# Patient Record
Sex: FEMALE | Race: WHITE | NOT HISPANIC OR LATINO
[De-identification: names, ages, dates, MRNs, and addresses within clinical notes are randomized per-mention and may not be internally consistent; named-entity substitution may affect disease eponyms.]

---

## 2022-12-12 PROBLEM — Z00.00 ENCOUNTER FOR PREVENTIVE HEALTH EXAMINATION: Status: ACTIVE | Noted: 2022-12-12

## 2022-12-13 ENCOUNTER — APPOINTMENT (OUTPATIENT)
Dept: OBGYN | Facility: CLINIC | Age: 22
End: 2022-12-13

## 2022-12-13 ENCOUNTER — NON-APPOINTMENT (OUTPATIENT)
Age: 22
End: 2022-12-13

## 2022-12-13 VITALS
BODY MASS INDEX: 31.23 KG/M2 | HEIGHT: 67 IN | SYSTOLIC BLOOD PRESSURE: 118 MMHG | DIASTOLIC BLOOD PRESSURE: 68 MMHG | WEIGHT: 199 LBS

## 2022-12-13 DIAGNOSIS — Z11.3 ENCOUNTER FOR SCREENING FOR INFECTIONS WITH A PREDOMINANTLY SEXUAL MODE OF TRANSMISSION: ICD-10-CM

## 2022-12-13 DIAGNOSIS — Z12.4 ENCOUNTER FOR SCREENING FOR MALIGNANT NEOPLASM OF CERVIX: ICD-10-CM

## 2022-12-13 DIAGNOSIS — Z11.51 ENCOUNTER FOR SCREENING FOR HUMAN PAPILLOMAVIRUS (HPV): ICD-10-CM

## 2022-12-13 DIAGNOSIS — Z01.419 ENCOUNTER FOR GYNECOLOGICAL EXAMINATION (GENERAL) (ROUTINE) W/OUT ABNORMAL FINDINGS: ICD-10-CM

## 2022-12-13 PROCEDURE — 99395 PREV VISIT EST AGE 18-39: CPT

## 2022-12-13 NOTE — PLAN
[FreeTextEntry1] : Pap smear completed discussed birth control options continue multivitamin return in 12 months

## 2022-12-13 NOTE — HISTORY OF PRESENT ILLNESS
[HIV test declined] : HIV test declined [Syphilis test declined] : Syphilis test declined [Gonorrhea test declined] : Gonorrhea test declined [Chlamydia test declined] : Chlamydia test declined [Trichomonas test declined] : Trichomonas test declined [HPV test declined] : HPV test declined [Hepatitis B test declined] : Hepatitis B test declined [Hepatitis C test declined] : Hepatitis C test declined [Y] : Patient is sexually active [N] : Patient denies prior pregnancies [Normal Amount/Duration] :  normal amount and duration [Regular Cycle Intervals] : periods have been regular [Men] : men [Vaginal] : vaginal [No] : No [TextBox_4] : PT HERE FOR ANNUAL EXAM.  [PapSmeardate] : 05/26/21 [TextBox_31] : NORMAL [LMPDate] : 11/16/22 [MensesFreq] : 28 [MensesLength] : 5 [TextBox_6] : 11/16/22 [TextBox_9] : 12 [TextBox_13] : 28 [TextBox_15] : 5 [FreeTextEntry1] : 11/1/6/22

## 2022-12-14 LAB
C TRACH RRNA SPEC QL NAA+PROBE: NOT DETECTED
HPV HIGH+LOW RISK DNA PNL CVX: NOT DETECTED
N GONORRHOEA RRNA SPEC QL NAA+PROBE: NOT DETECTED
SOURCE TP AMPLIFICATION: NORMAL

## 2022-12-28 LAB — CYTOLOGY CVX/VAG DOC THIN PREP: NORMAL

## 2023-12-31 PROBLEM — Z11.3 ENCOUNTER FOR SCREENING EXAMINATION FOR SEXUALLY TRANSMITTED DISEASE: Status: RESOLVED | Noted: 2022-12-13 | Resolved: 2022-12-27

## 2024-02-26 ENCOUNTER — OFFICE (OUTPATIENT)
Dept: URBAN - METROPOLITAN AREA CLINIC 12 | Facility: CLINIC | Age: 24
Setting detail: OPHTHALMOLOGY
End: 2024-02-26
Payer: COMMERCIAL

## 2024-02-26 DIAGNOSIS — H31.002: ICD-10-CM

## 2024-02-26 DIAGNOSIS — H47.393: ICD-10-CM

## 2024-02-26 PROCEDURE — N/C NO CHARGE: Performed by: STUDENT IN AN ORGANIZED HEALTH CARE EDUCATION/TRAINING PROGRAM

## 2024-02-26 ASSESSMENT — CONFRONTATIONAL VISUAL FIELD TEST (CVF)
OD_FINDINGS: FULL
OS_FINDINGS: FULL

## 2024-02-27 ASSESSMENT — REFRACTION_AUTOREFRACTION
OS_SPHERE: -0.25
OD_AXIS: 143
OS_CYLINDER: -0.75
OS_AXIS: 009
OD_SPHERE: -0.25
OD_CYLINDER: -0.50

## 2024-02-27 ASSESSMENT — SPHEQUIV_DERIVED
OS_SPHEQUIV: -0.625
OD_SPHEQUIV: -0.5

## 2024-02-28 ENCOUNTER — OFFICE (OUTPATIENT)
Dept: URBAN - METROPOLITAN AREA CLINIC 88 | Facility: CLINIC | Age: 24
Setting detail: OPHTHALMOLOGY
End: 2024-02-28
Payer: COMMERCIAL

## 2024-02-28 DIAGNOSIS — H31.002: ICD-10-CM

## 2024-02-28 PROBLEM — H47.393 OTHER DISORDERS OF OPTIC DISC; BOTH EYES: Status: ACTIVE | Noted: 2024-02-26

## 2024-02-28 PROCEDURE — 92134 CPTRZ OPH DX IMG PST SGM RTA: CPT | Performed by: OPHTHALMOLOGY

## 2024-02-28 PROCEDURE — 92012 INTRM OPH EXAM EST PATIENT: CPT | Performed by: OPHTHALMOLOGY

## 2024-02-28 ASSESSMENT — REFRACTION_AUTOREFRACTION
OS_AXIS: 009
OD_CYLINDER: -0.50
OS_CYLINDER: -0.75
OD_AXIS: 143
OS_SPHERE: -0.25
OD_SPHERE: -0.25

## 2024-02-28 ASSESSMENT — CONFRONTATIONAL VISUAL FIELD TEST (CVF)
OS_FINDINGS: FULL
OD_FINDINGS: FULL

## 2024-02-28 ASSESSMENT — SPHEQUIV_DERIVED
OD_SPHEQUIV: -0.5
OS_SPHEQUIV: -0.625

## 2024-04-17 ENCOUNTER — OFFICE (OUTPATIENT)
Dept: URBAN - METROPOLITAN AREA CLINIC 115 | Facility: CLINIC | Age: 24
Setting detail: OPHTHALMOLOGY
End: 2024-04-17
Payer: COMMERCIAL

## 2024-04-17 DIAGNOSIS — H53.433: ICD-10-CM

## 2024-04-17 DIAGNOSIS — G93.2: ICD-10-CM

## 2024-04-17 DIAGNOSIS — H47.11: ICD-10-CM

## 2024-04-17 PROCEDURE — 92014 COMPRE OPH EXAM EST PT 1/>: CPT | Performed by: OPHTHALMOLOGY

## 2024-04-17 PROCEDURE — 92133 CPTRZD OPH DX IMG PST SGM ON: CPT | Performed by: OPHTHALMOLOGY

## 2024-04-17 PROCEDURE — 92083 EXTENDED VISUAL FIELD XM: CPT | Performed by: OPHTHALMOLOGY

## 2024-05-08 ENCOUNTER — APPOINTMENT (OUTPATIENT)
Dept: NEUROLOGY | Facility: CLINIC | Age: 24
End: 2024-05-08

## 2024-05-08 ENCOUNTER — OFFICE (OUTPATIENT)
Dept: URBAN - METROPOLITAN AREA CLINIC 115 | Facility: CLINIC | Age: 24
Setting detail: OPHTHALMOLOGY
End: 2024-05-08
Payer: COMMERCIAL

## 2024-05-08 DIAGNOSIS — G93.2: ICD-10-CM

## 2024-05-08 DIAGNOSIS — H53.433: ICD-10-CM

## 2024-05-08 DIAGNOSIS — H47.11: ICD-10-CM

## 2024-05-08 PROCEDURE — 92133 CPTRZD OPH DX IMG PST SGM ON: CPT | Performed by: OPHTHALMOLOGY

## 2024-05-08 PROCEDURE — 92012 INTRM OPH EXAM EST PATIENT: CPT | Performed by: OPHTHALMOLOGY

## 2024-05-08 PROCEDURE — 92083 EXTENDED VISUAL FIELD XM: CPT | Performed by: OPHTHALMOLOGY

## 2024-05-08 ASSESSMENT — CONFRONTATIONAL VISUAL FIELD TEST (CVF)
OS_FINDINGS: FULL
OD_FINDINGS: FULL

## 2024-05-29 ENCOUNTER — OFFICE (OUTPATIENT)
Dept: URBAN - METROPOLITAN AREA CLINIC 88 | Facility: CLINIC | Age: 24
Setting detail: OPHTHALMOLOGY
End: 2024-05-29
Payer: COMMERCIAL

## 2024-05-29 DIAGNOSIS — H31.002: ICD-10-CM

## 2024-05-29 PROCEDURE — 92012 INTRM OPH EXAM EST PATIENT: CPT | Performed by: OPHTHALMOLOGY

## 2024-05-29 PROCEDURE — 92134 CPTRZ OPH DX IMG PST SGM RTA: CPT | Performed by: OPHTHALMOLOGY

## 2024-05-29 ASSESSMENT — CONFRONTATIONAL VISUAL FIELD TEST (CVF)
OD_FINDINGS: FULL
OS_FINDINGS: FULL

## 2024-08-07 ENCOUNTER — OFFICE (OUTPATIENT)
Dept: URBAN - METROPOLITAN AREA CLINIC 115 | Facility: CLINIC | Age: 24
Setting detail: OPHTHALMOLOGY
End: 2024-08-07
Payer: COMMERCIAL

## 2024-08-07 DIAGNOSIS — H47.393: ICD-10-CM

## 2024-08-07 DIAGNOSIS — H47.11: ICD-10-CM

## 2024-08-07 DIAGNOSIS — G93.2: ICD-10-CM

## 2024-08-07 DIAGNOSIS — H53.433: ICD-10-CM

## 2024-08-07 PROCEDURE — 92083 EXTENDED VISUAL FIELD XM: CPT | Performed by: OPHTHALMOLOGY

## 2024-08-07 PROCEDURE — 92012 INTRM OPH EXAM EST PATIENT: CPT | Performed by: OPHTHALMOLOGY

## 2024-08-07 PROCEDURE — 92133 CPTRZD OPH DX IMG PST SGM ON: CPT | Performed by: OPHTHALMOLOGY

## 2024-08-07 ASSESSMENT — CONFRONTATIONAL VISUAL FIELD TEST (CVF)
OD_FINDINGS: FULL
OS_FINDINGS: FULL

## 2024-10-30 ENCOUNTER — APPOINTMENT (OUTPATIENT)
Dept: NEUROLOGY | Facility: CLINIC | Age: 24
End: 2024-10-30
Payer: COMMERCIAL

## 2024-10-30 VITALS
HEART RATE: 62 BPM | OXYGEN SATURATION: 100 % | WEIGHT: 214 LBS | BODY MASS INDEX: 33.59 KG/M2 | DIASTOLIC BLOOD PRESSURE: 76 MMHG | SYSTOLIC BLOOD PRESSURE: 111 MMHG | HEIGHT: 67 IN

## 2024-10-30 DIAGNOSIS — G93.2 BENIGN INTRACRANIAL HYPERTENSION: ICD-10-CM

## 2024-10-30 DIAGNOSIS — G43.709 CHRONIC MIGRAINE W/OUT AURA, NOT INTRACTABLE, W/OUT STATUS MIGRAINOSUS: ICD-10-CM

## 2024-10-30 PROCEDURE — 99204 OFFICE O/P NEW MOD 45 MIN: CPT

## 2024-10-30 RX ORDER — ACETAZOLAMIDE 250 MG
TABLET ORAL
Refills: 0 | Status: ACTIVE | COMMUNITY

## 2024-10-30 RX ORDER — METOPROLOL SUCCINATE 25 MG/1
25 TABLET, EXTENDED RELEASE ORAL
Refills: 0 | Status: ACTIVE | COMMUNITY

## 2024-10-30 RX ORDER — TOPIRAMATE 25 MG/1
25 TABLET, FILM COATED ORAL TWICE DAILY
Qty: 60 | Refills: 3 | Status: ACTIVE | COMMUNITY
Start: 2024-10-30 | End: 1900-01-01

## 2024-10-30 RX ORDER — RIZATRIPTAN BENZOATE 10 MG/1
10 TABLET ORAL
Qty: 9 | Refills: 3 | Status: ACTIVE | COMMUNITY
Start: 2024-10-30 | End: 1900-01-01

## 2024-10-30 RX ORDER — ATOGEPANT 60 MG/1
60 TABLET ORAL
Refills: 0 | Status: ACTIVE | COMMUNITY

## 2024-11-06 ENCOUNTER — NON-APPOINTMENT (OUTPATIENT)
Age: 24
End: 2024-11-06

## 2024-11-06 ENCOUNTER — OFFICE (OUTPATIENT)
Dept: URBAN - METROPOLITAN AREA CLINIC 115 | Facility: CLINIC | Age: 24
Setting detail: OPHTHALMOLOGY
End: 2024-11-06
Payer: COMMERCIAL

## 2024-11-06 DIAGNOSIS — H53.433: ICD-10-CM

## 2024-11-06 DIAGNOSIS — H47.11: ICD-10-CM

## 2024-11-06 DIAGNOSIS — H47.393: ICD-10-CM

## 2024-11-06 DIAGNOSIS — G93.2: ICD-10-CM

## 2024-11-06 PROCEDURE — 92133 CPTRZD OPH DX IMG PST SGM ON: CPT | Performed by: OPHTHALMOLOGY

## 2024-11-06 PROCEDURE — 92083 EXTENDED VISUAL FIELD XM: CPT | Performed by: OPHTHALMOLOGY

## 2024-11-06 PROCEDURE — 92012 INTRM OPH EXAM EST PATIENT: CPT | Performed by: OPHTHALMOLOGY

## 2024-11-06 ASSESSMENT — KERATOMETRY
OD_AXISANGLE_DEGREES: 097
OS_AXISANGLE_DEGREES: 085
OS_K2POWER_DIOPTERS: 45.50
OD_K2POWER_DIOPTERS: 45.50
OD_K1POWER_DIOPTERS: 45.25
OS_K1POWER_DIOPTERS: 45.00

## 2024-11-06 ASSESSMENT — REFRACTION_AUTOREFRACTION
OS_SPHERE: -0.25
OD_CYLINDER: -0.50
OD_AXIS: 135
OD_SPHERE: 0.00
OS_AXIS: 180
OS_CYLINDER: -0.50

## 2024-11-06 ASSESSMENT — CONFRONTATIONAL VISUAL FIELD TEST (CVF)
OS_FINDINGS: FULL
OD_FINDINGS: FULL

## 2024-11-06 ASSESSMENT — VISUAL ACUITY
OD_BCVA: 20/20-1
OS_BCVA: 20/20

## 2024-11-06 ASSESSMENT — TONOMETRY: OS_IOP_MMHG: 16

## 2025-01-30 ENCOUNTER — APPOINTMENT (OUTPATIENT)
Dept: NEUROLOGY | Facility: CLINIC | Age: 25
End: 2025-01-30
Payer: COMMERCIAL

## 2025-01-30 VITALS
HEART RATE: 66 BPM | HEIGHT: 67 IN | OXYGEN SATURATION: 99 % | SYSTOLIC BLOOD PRESSURE: 122 MMHG | WEIGHT: 206 LBS | BODY MASS INDEX: 32.33 KG/M2 | DIASTOLIC BLOOD PRESSURE: 83 MMHG

## 2025-01-30 DIAGNOSIS — G93.2 BENIGN INTRACRANIAL HYPERTENSION: ICD-10-CM

## 2025-01-30 DIAGNOSIS — G43.709 CHRONIC MIGRAINE W/OUT AURA, NOT INTRACTABLE, W/OUT STATUS MIGRAINOSUS: ICD-10-CM

## 2025-01-30 PROCEDURE — 99214 OFFICE O/P EST MOD 30 MIN: CPT

## 2025-01-30 RX ORDER — ACETAZOLAMIDE 500 MG/1
500 CAPSULE ORAL TWICE DAILY
Qty: 120 | Refills: 3 | Status: ACTIVE | COMMUNITY
Start: 2025-01-30 | End: 1900-01-01

## 2025-01-30 RX ORDER — TOPIRAMATE 50 MG/1
50 TABLET, FILM COATED ORAL TWICE DAILY
Qty: 180 | Refills: 1 | Status: ACTIVE | COMMUNITY
Start: 2025-01-30 | End: 1900-01-01

## 2025-02-27 ENCOUNTER — RX RENEWAL (OUTPATIENT)
Age: 25
End: 2025-02-27

## 2025-03-12 ENCOUNTER — OFFICE (OUTPATIENT)
Dept: URBAN - METROPOLITAN AREA CLINIC 115 | Facility: CLINIC | Age: 25
Setting detail: OPHTHALMOLOGY
End: 2025-03-12
Payer: COMMERCIAL

## 2025-03-12 DIAGNOSIS — H53.433: ICD-10-CM

## 2025-03-12 DIAGNOSIS — H47.11: ICD-10-CM

## 2025-03-12 DIAGNOSIS — H47.393: ICD-10-CM

## 2025-03-12 DIAGNOSIS — G93.2: ICD-10-CM

## 2025-03-12 PROCEDURE — 92012 INTRM OPH EXAM EST PATIENT: CPT | Performed by: OPHTHALMOLOGY

## 2025-03-12 PROCEDURE — 92083 EXTENDED VISUAL FIELD XM: CPT | Performed by: OPHTHALMOLOGY

## 2025-03-12 PROCEDURE — 92133 CPTRZD OPH DX IMG PST SGM ON: CPT | Performed by: OPHTHALMOLOGY

## 2025-03-12 ASSESSMENT — KERATOMETRY
OD_K1POWER_DIOPTERS: 45.25
OS_AXISANGLE_DEGREES: 085
OS_K2POWER_DIOPTERS: 45.50
OD_K2POWER_DIOPTERS: 45.50
OD_AXISANGLE_DEGREES: 097
OS_K1POWER_DIOPTERS: 45.00

## 2025-03-12 ASSESSMENT — REFRACTION_AUTOREFRACTION
OS_AXIS: 171
OD_CYLINDER: -0.75
OS_CYLINDER: -0.75
OS_SPHERE: -0.50
OD_AXIS: 128
OD_SPHERE: -0.25

## 2025-03-12 ASSESSMENT — VISUAL ACUITY
OD_BCVA: 20/20
OS_BCVA: 20/20

## 2025-03-12 ASSESSMENT — TONOMETRY: OD_IOP_MMHG: 14

## 2025-03-12 ASSESSMENT — CONFRONTATIONAL VISUAL FIELD TEST (CVF)
OD_FINDINGS: FULL
OS_FINDINGS: FULL

## 2025-04-16 ENCOUNTER — NON-APPOINTMENT (OUTPATIENT)
Age: 25
End: 2025-04-16

## 2025-04-17 ENCOUNTER — APPOINTMENT (OUTPATIENT)
Dept: NEUROLOGY | Facility: CLINIC | Age: 25
End: 2025-04-17
Payer: COMMERCIAL

## 2025-04-17 VITALS
BODY MASS INDEX: 31.67 KG/M2 | SYSTOLIC BLOOD PRESSURE: 127 MMHG | OXYGEN SATURATION: 99 % | DIASTOLIC BLOOD PRESSURE: 83 MMHG | HEART RATE: 76 BPM | HEIGHT: 68 IN | WEIGHT: 209 LBS

## 2025-04-17 DIAGNOSIS — G43.709 CHRONIC MIGRAINE W/OUT AURA, NOT INTRACTABLE, W/OUT STATUS MIGRAINOSUS: ICD-10-CM

## 2025-04-17 DIAGNOSIS — G93.2 BENIGN INTRACRANIAL HYPERTENSION: ICD-10-CM

## 2025-04-17 PROCEDURE — 99214 OFFICE O/P EST MOD 30 MIN: CPT

## 2025-04-17 RX ORDER — RIMEGEPANT SULFATE 75 MG/75MG
75 TABLET, ORALLY DISINTEGRATING ORAL
Qty: 32 | Refills: 2 | Status: ACTIVE | COMMUNITY
Start: 2025-04-17 | End: 1900-01-01

## 2025-06-11 ENCOUNTER — OFFICE (OUTPATIENT)
Dept: URBAN - METROPOLITAN AREA CLINIC 115 | Facility: CLINIC | Age: 25
Setting detail: OPHTHALMOLOGY
End: 2025-06-11
Payer: COMMERCIAL

## 2025-06-11 DIAGNOSIS — H47.11: ICD-10-CM

## 2025-06-11 DIAGNOSIS — H53.433: ICD-10-CM

## 2025-06-11 DIAGNOSIS — G93.2: ICD-10-CM

## 2025-06-11 PROCEDURE — 92014 COMPRE OPH EXAM EST PT 1/>: CPT | Performed by: OPHTHALMOLOGY

## 2025-06-11 PROCEDURE — 92133 CPTRZD OPH DX IMG PST SGM ON: CPT | Performed by: OPHTHALMOLOGY

## 2025-06-11 PROCEDURE — 92083 EXTENDED VISUAL FIELD XM: CPT | Performed by: OPHTHALMOLOGY

## 2025-06-11 ASSESSMENT — CONFRONTATIONAL VISUAL FIELD TEST (CVF)
OS_FINDINGS: FULL
OD_FINDINGS: FULL

## 2025-06-11 ASSESSMENT — KERATOMETRY
OD_K2POWER_DIOPTERS: 45.50
OS_K2POWER_DIOPTERS: 45.50
OD_AXISANGLE_DEGREES: 097
OS_AXISANGLE_DEGREES: 085
OD_K1POWER_DIOPTERS: 45.25
OS_K1POWER_DIOPTERS: 45.00

## 2025-06-11 ASSESSMENT — TONOMETRY
OS_IOP_MMHG: 11
OD_IOP_MMHG: 12

## 2025-06-11 ASSESSMENT — REFRACTION_AUTOREFRACTION
OS_SPHERE: 0.00
OS_CYLINDER: -0.50
OS_AXIS: 019
OD_SPHERE: 0.00
OD_AXIS: 119
OD_CYLINDER: -0.50

## 2025-06-11 ASSESSMENT — VISUAL ACUITY
OS_BCVA: 20/20
OD_BCVA: 20/20

## 2025-07-02 ENCOUNTER — RX RENEWAL (OUTPATIENT)
Age: 25
End: 2025-07-02

## 2025-07-17 ENCOUNTER — APPOINTMENT (OUTPATIENT)
Dept: NEUROLOGY | Facility: CLINIC | Age: 25
End: 2025-07-17

## 2025-07-17 VITALS
WEIGHT: 215 LBS | DIASTOLIC BLOOD PRESSURE: 71 MMHG | SYSTOLIC BLOOD PRESSURE: 109 MMHG | OXYGEN SATURATION: 99 % | BODY MASS INDEX: 32.58 KG/M2 | HEART RATE: 46 BPM | HEIGHT: 68 IN

## 2025-07-17 PROCEDURE — 99214 OFFICE O/P EST MOD 30 MIN: CPT

## 2025-09-09 ENCOUNTER — RX RENEWAL (OUTPATIENT)
Age: 25
End: 2025-09-09